# Patient Record
Sex: MALE | Employment: FULL TIME | URBAN - METROPOLITAN AREA
[De-identification: names, ages, dates, MRNs, and addresses within clinical notes are randomized per-mention and may not be internally consistent; named-entity substitution may affect disease eponyms.]

---

## 2018-11-15 ENCOUNTER — HOSPITAL ENCOUNTER (EMERGENCY)
Facility: CLINIC | Age: 33
Discharge: HOME | End: 2018-11-15
Attending: EMERGENCY MEDICINE | Admitting: EMERGENCY MEDICINE
Payer: OTHER MISCELLANEOUS

## 2018-11-15 VITALS
TEMPERATURE: 98.5 F | HEART RATE: 65 BPM | DIASTOLIC BLOOD PRESSURE: 64 MMHG | OXYGEN SATURATION: 98 % | RESPIRATION RATE: 16 BRPM | SYSTOLIC BLOOD PRESSURE: 126 MMHG

## 2018-11-15 DIAGNOSIS — Z57.8 EMPLOYEE EXPOSURE TO BODY FLUIDS: ICD-10-CM

## 2018-11-15 PROCEDURE — 99282 EMERGENCY DEPT VISIT SF MDM: CPT | Performed by: EMERGENCY MEDICINE

## 2018-11-15 PROCEDURE — 99283 EMERGENCY DEPT VISIT LOW MDM: CPT | Mod: Z6 | Performed by: EMERGENCY MEDICINE

## 2018-11-15 RX ORDER — EMTRICITABINE AND TENOFOVIR DISOPROXIL FUMARATE 200; 300 MG/1; MG/1
1 TABLET, FILM COATED ORAL DAILY
Qty: 30 TABLET | Refills: 0 | Status: SHIPPED | OUTPATIENT
Start: 2018-11-15

## 2018-11-15 SDOH — HEALTH STABILITY - PHYSICAL HEALTH: OCCUPATIONAL EXPOSURE TO OTHER RISK FACTORS: Z57.8

## 2018-11-15 NOTE — ED AVS SNAPSHOT
[facility], Emergency Department    [address] [address]    Phone:  [phone]                                       Cayla Yi   MRN: [id number]    Department:  [facility], Emergency Department   Date of Visit:  11/15/2018           Patient Information     Date Of Birth          1985        Your diagnoses for this visit were:     Employee exposure to body fluids        You were seen by Corie Kimbrough MD.        Discharge Instructions       You have been seen in the emergency department today after a body fluid exposure.  Baced on the source patient's HIV status, we have offered postexposure prophylaxis for you.  You have been given the HIV postexposure prophylaxis starter kit here in the ED.  We recommend that you follow-up with employee health via phone tomorrow.  See the phone number below.  They can talk with you about the duration of treatment and follow up.    We have given you a prescription for one month for Tivicay and Truvada.  You do not need to fill this right now - it is OK to wait to see what [facility]'s recommendation is for you.    Please call to follow up with Employee Health Services (phone: [phone]) as soon as possible.      Discharge References/Attachments     BODY FLUID EXPOSURE, HEALTHCARE WORKER (ENGLISH)      24 Hour Appointment Hotline       To make an appointment at any [place] clinic, call [phone] ([phone]). If you don't have a family doctor or clinic, we will help you find one. [place] clinics are conveniently located to serve the needs of you and your family.             Review of your medicines      START taking        Dose / Directions Last dose taken    dolutegravir 50 MG tablet   Commonly known as:  TIVICAY   Dose:  50 mg   Quantity:  30 tablet        Take 1 tablet (50 mg) by mouth daily   Refills:  0        emtricitabine-tenofovir 200-300 MG per tablet   Commonly known as:  TRUVADA   Dose:  1 tablet   Quantity:   "30 tablet        Take 1 tablet by mouth daily   Refills:  0          Our records show that you are taking the medicines listed below. If these are incorrect, please call your family doctor or clinic.        Dose / Directions Last dose taken    PROPRANOLOL HCL PO   Dose:  80 mg        Take 80 mg by mouth daily   Refills:  0        RIZATRIPTAN BENZOATE PO        Refills:  0                Prescriptions were sent or printed at these locations (2 Prescriptions)                   Other Prescriptions                Printed at Department/Unit printer (2 of 2)         dolutegravir (TIVICAY) 50 MG tablet               emtricitabine-tenofovir (TRUVADA) 200-300 MG per tablet                Orders Needing Specimen Collection     None      Pending Results     No orders found from 11/13/2018 to 11/16/2018.            Pending Culture Results     No orders found from 11/13/2018 to 11/16/2018.            Pending Results Instructions     If you had any lab results that were not finalized at the time of your Discharge, you can call the ED Lab Result RN at [phone]. You will be contacted by this team for any positive Lab results or changes in treatment. The nurses are available 7 days a week from 10A to 6:30P.  You can leave a message 24 hours per day and they will return your call.        Thank you for choosing [facility]       Thank you for choosing [facility] for your care. Our goal is always to provide you with excellent care. Hearing back from our patients is one way we can continue to improve our services. Please take a few minutes to complete the written survey that you may receive in the mail after you visit with us. Thank you!        [facility] Information     [employer] lets you send messages to your doctor, view your test results, renew your prescriptions, schedule appointments and more. To sign up, go to [email or web address] . Click on \"Log in\" on the left side of the screen, which will take you to the Welcome page. Then " "click on \"Sign up Now\" on the right side of the page.     You will be asked to enter the access code listed below, as well as some personal information. Please follow the directions to create your username and password.     Your access code is: [email or web address]  Expires: 2019  6:04 PM     Your access code will  in 90 days. If you need help or a new code, please call your [place] clinic or [phone].        Care EveryWhere ID     This is your Care EveryWhere ID. This could be used by other organizations to access your [place] medical records  [id number]        Equal Access to Services     [facility] HospitalANDRESSA : Aki Valera, adriana navarro, rufina mohan, nadia villanueva. So [facility] [phone].    ATENCIÓN: Si habla español, tiene a [place] disposición servicios gratuitos de asistencia lingüística. Llame al [phone].    We comply with applicable federal civil rights laws and [place] laws. We do not discriminate on the basis of race, color, national origin, age, disability, sex, sexual orientation, or gender identity.            After Visit Summary       This is your record. Keep this with you and show to your community pharmacist(s) and doctor(s) at your next visit.                  "

## 2018-11-15 NOTE — ED AVS SNAPSHOT
[facility], [place], Emergency Department    [address] [place] [id number]    Phone:  [phone]                                       Cayla Yi   MRN: [id number]    Department:  [facility], Emergency Department   Date of Visit:  11/15/2018           After Visit Summary Signature Page     I have received my discharge instructions, and my questions have been answered. I have discussed any challenges I see with this plan with the nurse or doctor.    ..........................................................................................................................................  Patient/Patient Representative Signature      ..........................................................................................................................................  Patient Representative Print Name and Relationship to Patient    ..................................................               ................................................  Date                                   Time    ..........................................................................................................................................  Reviewed by Signature/Title    ...................................................              ..............................................  Date                                               Time          22EPIC Rev 08/18

## 2018-11-15 NOTE — ED PROVIDER NOTES
History     Chief Complaint   Patient presents with     Body Fluid Exposure     The history is provided by the patient.     Cayla Yi is a 32 year old male who presents to the emergency department today after a possible body fluid exposure.  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  He was suturing the patient when he felt the suture needle strike his finger.  He does not think that the glove was punctured and does not think that it penetrated the finger.  There was no bleeding.  He immediately washed his hands and ultimately was sent to come to the emergency department as it is after hours and employee health is not available.  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    The source patient had a recent viral load which was undetectable.    I have reviewed the Medications, Allergies, Past Medical and Surgical History, and Social History in the Epic system.    Review of Systems   Skin: Wound: possible puncture?       Physical Exam          Physical Exam   Constitutional: He is oriented to person, place, and time. He appears well-developed and well-nourished.   HENT:   Head: Normocephalic.   Pulmonary/Chest: Effort normal.   Neurological: He is alert and oriented to person, place, and time.   Skin:   Pointer finger: no evidence of puncture on exam   Psychiatric: He has a normal mood and affect.   Nursing note and vitals reviewed.      ED Course     ED Course     Procedures             Critical Care time:  none             Labs Ordered and Resulted from Time of ED Arrival Up to the Time of Departure from the ED - No data to display         Assessments & Plan (with Medical Decision Making)   Patient presents for body fluid exposure.  On exam I cannot appreciate a puncture wound on the pointer finger.  He is relatively certain  that the glove was not penetrated.  He does know that the source patient has an undetectable viral load.    I did offer him post exposure prophylaxis as this was a known HIV positive source patient.  Certainly, the fact that this was not a hollow bore needle is in his favor, as is the fact that the source patient's viral load is undetectable.  Clearly though, it would be prudent to offer HIV prophylaxis.  He ultimately decided to accept this.  HIV prophylaxis kit ordered.  We did give him the pill bottle with 3 days of medications.  He should definitely follow-up with employee health to determine duration of treatment.  I will give him a prescription for Tivicay and Truvada for up to 1 month, he does not need to fill it right now unless employee health recommends that he do so.    I have reviewed the nursing notes.    I have reviewed the findings, diagnosis, plan and need for follow up with the patient.    New Prescriptions    DOLUTEGRAVIR (TIVICAY) 50 MG TABLET    Take 1 tablet (50 mg) by mouth daily    EMTRICITABINE-TENOFOVIR (TRUVADA) 200-300 MG PER TABLET    Take 1 tablet by mouth daily       Final diagnoses:   Employee exposure to body fluids       11/15/2018   [facility], [place], EMERGENCY DEPARTMENT     Corie Kimbrough MD  11/15/18 1768

## 2018-11-16 NOTE — DISCHARGE INSTRUCTIONS
You have been seen in the emergency department today after a body fluid exposure.  Baced on the source patient's HIV status, we have offered postexposure prophylaxis for you.  You have been given the HIV postexposure prophylaxis starter kit here in the ED.  We recommend that you follow-up with employee health via phone tomorrow.  See the phone number below.  They can talk with you about the duration of treatment and follow up.    We have given you a prescription for one month for Tivicay and Truvada.  You do not need to fill this right now - it is OK to wait to see what Employee Health's recommendation is for you.    Please call to follow up with Employee Health Services (phone: [phone]) as soon as possible.

## 2018-11-19 ENCOUNTER — TELEPHONE (OUTPATIENT)
Dept: INFECTIOUS DISEASES | Facility: CLINIC | Age: 33
End: 2018-11-19

## 2018-11-19 NOTE — TELEPHONE ENCOUNTER
Called pt to offer apt with ID provider. Pt reports he has no questions and is not very concerned about his risk, however likely will continue his post exposure prophylaxis. Pt had questions about where he could  script and have it be paid by employee services. Pt will call back if he has any further questions.  Yuko Shin RN